# Patient Record
Sex: FEMALE | Race: OTHER | NOT HISPANIC OR LATINO | URBAN - METROPOLITAN AREA
[De-identification: names, ages, dates, MRNs, and addresses within clinical notes are randomized per-mention and may not be internally consistent; named-entity substitution may affect disease eponyms.]

---

## 2020-09-04 ENCOUNTER — EMERGENCY (EMERGENCY)
Facility: HOSPITAL | Age: 35
LOS: 0 days | Discharge: HOME | End: 2020-09-04
Attending: EMERGENCY MEDICINE | Admitting: EMERGENCY MEDICINE
Payer: COMMERCIAL

## 2020-09-04 VITALS
SYSTOLIC BLOOD PRESSURE: 133 MMHG | DIASTOLIC BLOOD PRESSURE: 77 MMHG | TEMPERATURE: 99 F | OXYGEN SATURATION: 97 % | HEART RATE: 109 BPM | RESPIRATION RATE: 20 BRPM

## 2020-09-04 VITALS — HEART RATE: 96 BPM

## 2020-09-04 DIAGNOSIS — R51 HEADACHE: ICD-10-CM

## 2020-09-04 DIAGNOSIS — R11.0 NAUSEA: ICD-10-CM
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
## 2020-09-04 DIAGNOSIS — Z88.8 ALLERGY STATUS TO OTHER DRUGS, MEDICAMENTS AND BIOLOGICAL SUBSTANCES: ICD-10-CM

## 2020-09-04 DIAGNOSIS — J45.909 UNSPECIFIED ASTHMA, UNCOMPLICATED: ICD-10-CM

## 2020-09-04 PROCEDURE — 99284 EMERGENCY DEPT VISIT MOD MDM: CPT

## 2020-09-04 RX ORDER — IBUPROFEN 200 MG
600 TABLET ORAL ONCE
Refills: 0 | Status: COMPLETED | OUTPATIENT
Start: 2020-09-04 | End: 2020-09-04

## 2020-09-04 RX ADMIN — Medication 600 MILLIGRAM(S): at 16:33
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
## 2020-09-04 NOTE — ED ADULT NURSE NOTE - NSIMPLEMENTINTERV_GEN_ALL_ED
Implemented All Universal Safety Interventions:  [place] to call system. Call bell, personal items and telephone within reach. Instruct patient to call for assistance. Room bathroom lighting operational. Non-slip footwear when patient is off stretcher. Physically safe environment: no spills, clutter or unnecessary equipment. Stretcher in lowest position, wheels locked, appropriate side rails in place.

## 2020-09-04 NOTE — ED PROVIDER NOTE - NS ED ROS FT
Constitutional: no fever, chills, no recent weight loss, change in appetite or malaise  Eyes: no redness/discharge/pain/vision changes  ENT: no rhinorrhea/ear pain/sore throat  Cardiac: No chest pain, SOB or edema.  Respiratory: No cough or respiratory distress  GI: No vomiting, diarrhea or abdominal pain.  : No dysuria, frequency, urgency or hematuria  MS: no pain to back or extremities, no loss of ROM, no weakness  Neuro: No  weakness. No LOC.  Skin: No skin rash.  Except as documented in the HPI, all other systems are negative.

## 2020-09-04 NOTE — ED PROVIDER NOTE - PATIENT PORTAL LINK FT
You can access the FollowMyHealth Patient Portal offered by [facility] by registering at the following website: [email or web address]. By joining [facility]’s FollowMyHealth portal, you will also be able to view your health information using other applications (apps) compatible with our system.

## 2020-09-04 NOTE — ED PROVIDER NOTE - PHYSICAL EXAMINATION
Alert, NAD, WDWN, NCAT, no skull/facial tender, no step-offs, no raccoon/boyer, non-toxic appearing, PERRL, EOMI, normal pupils, no icterus, normal external ENT, pink/moist membranes, pharynx normal, no sinus/tmj/dental/temporal/mastoid tender, no septal hematoma, airway intact, normal resp effort, speaking full sentences, lungs CTA b/l, CVS1S2, RRR, no m/g/r, no JVD, 2+ pulses b/l, no edema/cords/homans, warm/well-perfused, abdomen soft, no tender/dist/guard/rebound, no CVA tender, no cspine tender/step-offs, FROM neck, supple, no meningismus, trach midline, pelvis stable, no TLS spinal tender/deform/step-offs, FROM all 4 ext, no matt tender/deform, skin warm/dry, no rash, AAOx3, CN 2-12 intact, normal motor, normal sensory, normal gait, GCS15.

## 2020-09-04 NOTE — ED PROVIDER NOTE - NSFOLLOWUPCLINICS_GEN_ALL_ED_FT
[facility] ([facility])  Rehabilitation  [facility] [address], [address] [place] [address]  Phone: [phone]  Fax:   Follow Up Time:
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
## 2020-09-04 NOTE — ED PROVIDER NOTE - NSFOLLOWUPINSTRUCTIONS_ED_ALL_ED_FT

## 2021-02-10 ENCOUNTER — TRANSCRIPTION ENCOUNTER (OUTPATIENT)
Age: 36
End: 2021-02-10

## 2021-07-27 PROBLEM — Z00.00 ENCOUNTER FOR PREVENTIVE HEALTH EXAMINATION: Status: ACTIVE | Noted: 2021-07-27

## 2021-09-28 PROBLEM — J45.909 UNSPECIFIED ASTHMA, UNCOMPLICATED: Chronic | Status: ACTIVE | Noted: 2020-09-04

## 2021-09-29 ENCOUNTER — APPOINTMENT (OUTPATIENT)
Dept: ENDOCRINOLOGY | Facility: CLINIC | Age: 36
End: 2021-09-29
Payer: MEDICAID

## 2021-09-29 VITALS
TEMPERATURE: 97.2 F | SYSTOLIC BLOOD PRESSURE: 130 MMHG | OXYGEN SATURATION: 97 % | WEIGHT: 200 LBS | DIASTOLIC BLOOD PRESSURE: 70 MMHG | HEART RATE: 108 BPM | HEIGHT: 67 IN | BODY MASS INDEX: 31.39 KG/M2

## 2021-09-29 DIAGNOSIS — Z78.9 OTHER SPECIFIED HEALTH STATUS: ICD-10-CM

## 2021-09-29 DIAGNOSIS — Z83.3 FAMILY HISTORY OF DIABETES MELLITUS: ICD-10-CM

## 2021-09-29 DIAGNOSIS — Z82.49 FAMILY HISTORY OF ISCHEMIC HEART DISEASE AND OTHER DISEASES OF THE CIRCULATORY SYSTEM: ICD-10-CM

## 2021-09-29 DIAGNOSIS — Z83.49 FAMILY HISTORY OF OTHER ENDOCRINE, NUTRITIONAL AND METABOLIC DISEASES: ICD-10-CM

## 2021-09-29 PROCEDURE — 99204 OFFICE O/P NEW MOD 45 MIN: CPT

## 2021-09-29 RX ORDER — FLUTICASONE FUROATE AND VILANTEROL TRIFENATATE 100; 25 UG/1; UG/1
100-25 POWDER RESPIRATORY (INHALATION)
Refills: 0 | Status: ACTIVE | COMMUNITY

## 2021-09-29 RX ORDER — ALBUTEROL SULFATE 90 UG/1
108 (90 BASE) INHALANT RESPIRATORY (INHALATION)
Refills: 0 | Status: ACTIVE | COMMUNITY

## 2021-09-29 RX ORDER — LEVOTHYROXINE SODIUM 0.05 MG/1
50 TABLET ORAL DAILY
Qty: 68 | Refills: 4 | Status: ACTIVE | COMMUNITY
Start: 2021-09-29 | End: 1900-01-01

## 2021-09-29 NOTE — HISTORY OF PRESENT ILLNESS
[FreeTextEntry1] : 36 year old female with known Hashimoto hypothyroidism , ? PCOS who present to establish care , patient is now 11 weeks pregnant , previous endo dr Londono. \par \par Hypothyroidism :\par - diagnosed around 6 years ago, is on Synthroid brand 50 mcg 2 tablets daily , has been on this dose for > 2 years now. prefer 2 tablets 50 mcg and not the 100 mcg dosing as she tried and did not work for her . \par - current pregnancy was natural no, problems getting pregnant, has a 4 year old daughter \par - no hyper/or hypo symptoms, no hyperemesis, feeling ok except for fatigue \par \par MNG :\par followed by Dr LONDONO and had previous FNA done that where benign \par no compressive symptoms and no fh of thyroid cancer \par report FNA done in the past and was benign \par \par PCOS : she report was told has possible PCOS, periods were irregular in the past and she was on OCP, no fertility problems \par had glucose tolerance test done now , waiting result from Ob . had previous prediabetes range and used long time ago metformin with no problems,+ facial hair and cystic acne \par \par \par \par \par \par \par \par

## 2021-09-29 NOTE — REASON FOR VISIT
[Initial Evaluation] : an initial evaluation [Hypothyroidism] : hypothyroidism [Thyroid nodule/ MNG] : thyroid nodule/ MNG [PCOS] : PCOS

## 2021-09-29 NOTE — REVIEW OF SYSTEMS
[Fatigue] : fatigue [Recent Weight Gain (___ Lbs)] : recent weight gain: [unfilled] lbs [Acanthosis] : acanthosis [Hirsutism] : hirsutism [Hair Loss] : hair loss [Recent Weight Loss (___ Lbs)] : no recent weight loss [Blurred Vision] : no blurred vision [Dysphagia] : no dysphagia [Neck Pain] : no neck pain [Dysphonia] : no dysphonia [Chest Pain] : no chest pain [Slow Heart Rate] : heart rate is not slow [Palpitations] : no palpitations [Lower Ext Edema] : no lower extremity edema [Shortness Of Breath] : no shortness of breath [Cough] : no cough [Wheezing] : no wheezing [Nausea] : no nausea [Constipation] : no constipation [Diarrhea] : no diarrhea [Headaches] : no headaches [Tremors] : no tremors [Pain/Numbness of Digits] : no pain/numbness of digits [Cold Intolerance] : no cold intolerance [Heat Intolerance] : no heat intolerance

## 2021-09-29 NOTE — DATA REVIEWED
[FreeTextEntry1] : \par 8/27/21: TSH 2.250  FT4 1.38 glucose 88 Na 133 K 4  a1c 5.8% \par \par right lower pole FNA : \par bethesda II

## 2021-09-29 NOTE — ASSESSMENT
[FreeTextEntry1] : 36 year old female with known Hashimoto hypothyroidism , ? PCOS who present to establish care , patient is now 11 weeks pregnant , previous endo dr Londono. \par \par Hypothyroidism due to Hashimoto now 11 weeks pregnant \par - on Synthroid ( brand) 50 mcg 2 tablets daily, good pill technique no biotin use \par - increase to extra tablet on Saturday and sunday \par - recheck tft's in 6 weeks \par \par \par MNG :\par followed by Dr LONDONO and had previous FNA done that where benign \par no compressive symptoms and no fh of thyroid cancer \par FNA 2019 benign \par \par \par PCOS : she report was told has possible PCOS, periods were irregular in the past and she was on OCP, no fertility problems \par had glucose tolerance test done now , waiting result from Ob . had previous prediabetes range and used long time ago metformin with no problems,+ facial hair and cystic acne \par she will update me once she have results, reviewed carb consistent diet especially during pregnancy and possible use of metformin

## 2021-09-29 NOTE — PHYSICAL EXAM
[Alert] : alert [Well Nourished] : well nourished [Healthy Appearance] : healthy appearance [No Acute Distress] : no acute distress [No Proptosis] : no proptosis [No Lid Lag] : no lid lag [No Respiratory Distress] : no respiratory distress [No Accessory Muscle Use] : no accessory muscle use [Clear to Auscultation] : lungs were clear to auscultation bilaterally [Normal S1, S2] : normal S1 and S2 [No Murmurs] : no murmurs [No Edema] : no peripheral edema [Soft] : abdomen soft [No CVA Tenderness] : no ~M costovertebral angle tenderness [No Stigmata of Cushings Syndrome] : no stigmata of Cushings Syndrome [Abdominal Striae] : abdominal striae present [Acanthosis Nigricans] : acanthosis nigricans present [Hirsutism] : hirsutism present [de-identified] : thyroid enlarged , palpable nodules, + acanthosis

## 2021-10-22 RX ORDER — LEVOTHYROXINE SODIUM 50 UG/1
50 TABLET ORAL DAILY
Qty: 68 | Refills: 4 | Status: ACTIVE | COMMUNITY
Start: 2021-09-30 | End: 1900-01-01

## 2021-11-17 LAB
T3 SERPL-MCNC: 212 NG/DL
T4 FREE SERPL-MCNC: 1.4 NG/DL
TSH SERPL-ACNC: 2.16 UIU/ML

## 2021-11-23 ENCOUNTER — APPOINTMENT (OUTPATIENT)
Dept: ENDOCRINOLOGY | Facility: CLINIC | Age: 36
End: 2021-11-23
Payer: COMMERCIAL

## 2021-11-23 VITALS
BODY MASS INDEX: 32.49 KG/M2 | DIASTOLIC BLOOD PRESSURE: 78 MMHG | TEMPERATURE: 97 F | SYSTOLIC BLOOD PRESSURE: 118 MMHG | HEART RATE: 100 BPM | OXYGEN SATURATION: 97 % | WEIGHT: 207 LBS | HEIGHT: 67 IN

## 2021-11-23 DIAGNOSIS — E03.9 ENDOCRINE, NUTRITIONAL AND METABOLIC DISEASES COMPLICATING PREGNANCY, FIRST TRIMESTER: ICD-10-CM

## 2021-11-23 DIAGNOSIS — E03.9 ENDOCRINE, NUTRITIONAL AND METABOLIC DISEASES COMPLICATING PREGNANCY, SECOND TRIMESTER: ICD-10-CM

## 2021-11-23 DIAGNOSIS — O99.282 ENDOCRINE, NUTRITIONAL AND METABOLIC DISEASES COMPLICATING PREGNANCY, SECOND TRIMESTER: ICD-10-CM

## 2021-11-23 DIAGNOSIS — O99.281 ENDOCRINE, NUTRITIONAL AND METABOLIC DISEASES COMPLICATING PREGNANCY, FIRST TRIMESTER: ICD-10-CM

## 2021-11-23 PROCEDURE — 99213 OFFICE O/P EST LOW 20 MIN: CPT

## 2021-11-23 NOTE — DATA REVIEWED
[FreeTextEntry1] : \par 8/27/21: TSH 2.250  FT4 1.38 glucose 88 Na 133 K 4  a1c 5.8% \par 11/16/21: TSH 2.16  FT4 1.4 TT3 212 \par \par right lower pole FNA : \par bethesda II

## 2021-11-23 NOTE — PHYSICAL EXAM
[Alert] : alert [Well Nourished] : well nourished [Healthy Appearance] : healthy appearance [No Acute Distress] : no acute distress [No Proptosis] : no proptosis [No Lid Lag] : no lid lag [No Respiratory Distress] : no respiratory distress [No Accessory Muscle Use] : no accessory muscle use [Clear to Auscultation] : lungs were clear to auscultation bilaterally [Normal S1, S2] : normal S1 and S2 [No Murmurs] : no murmurs [No Edema] : no peripheral edema [No CVA Tenderness] : no ~M costovertebral angle tenderness [No Stigmata of Cushings Syndrome] : no stigmata of Cushings Syndrome [Abdominal Striae] : abdominal striae present [Acanthosis Nigricans] : acanthosis nigricans present [Hirsutism] : hirsutism present [de-identified] : thyroid enlarged , palpable nodules, + acanthosis

## 2021-11-23 NOTE — ASSESSMENT
[FreeTextEntry1] : 36 year old female with known Hashimoto hypothyroidism , ? PCOS who present for follow up,  patient is now 20 weeks pregnant \par  previous endo dr Londono. \par \par Hypothyroidism due to Hashimoto now 20 weeks pregnant \par - on Synthroid ( brand) 50 mcg 2 tablets daily, with extra tablet on Saturday and sunday  good pill technique no biotin use \par - continue same \par - recheck tft's in 8 weeks \par \par \par MNG :\par followed by Dr LONDONO and had previous FNA done that where benign \par no compressive symptoms and no fh of thyroid cancer \par FNA 2019 benign \par \par \par PCOS : she report was told has possible PCOS, periods were irregular in the past and she was on OCP, no fertility problems \par had glucose tolerance test done  and 3 hour glucose test is normal as by patient  . had previous prediabetes range and used long time ago metformin with no problems,+ facial hair and cystic acne \par , reviewed carb consistent diet especially during pregnancy and possible use of metformin

## 2021-11-23 NOTE — HISTORY OF PRESENT ILLNESS
[FreeTextEntry1] : 36 year old female with known Hashimoto hypothyroidism , ? PCOS who present for follow up ,  patient is now 20 weeks pregnant , previous endo dr Londono. \par \par Hypothyroidism :\par - diagnosed around 6 years ago, is on Synthroid brand 50 mcg 2 tablets daily , has been on this dose for > 2 years now. prefer 2 tablets 50 mcg and not the 100 mcg dosing as she tried and did not work for her . \par - current pregnancy was natural no, problems getting pregnant, has a 4 year old daughter \par - no hyper/or hypo symptoms, no hyperemesis, feeling ok except for fatigue \par - 11/2021: continue to take Synthroid 50 mcg 2 tablet daily and 3 tablet over the weekend, feeling ok except for fatigue, baby is ok . \par had the 3 hour glucose tolerance test few weeks ago and was ok \par \par MNG :\par followed by Dr LONDONO and had previous FNA done that where benign \par no compressive symptoms and no fh of thyroid cancer \par report FNA done in the past and was benign \par \par PCOS : she report was told has possible PCOS, periods were irregular in the past and she was on OCP, no fertility problems \par had glucose tolerance test done now , waiting result from Ob . had previous prediabetes range and used long time ago metformin with no problems,+ facial hair and cystic acne \par \par \par \par \par \par \par \par \par

## 2021-11-23 NOTE — REASON FOR VISIT
[Follow - Up] : a follow-up visit [Hypothyroidism] : hypothyroidism [Thyroid nodule/ MNG] : thyroid nodule/ MNG [PCOS] : PCOS

## 2021-12-08 ENCOUNTER — TRANSCRIPTION ENCOUNTER (OUTPATIENT)
Age: 36
End: 2021-12-08

## 2022-02-01 LAB
T3 SERPL-MCNC: 208 NG/DL
T4 SERPL-MCNC: 13.5 UG/DL
TSH SERPL-ACNC: 2.23 UIU/ML

## 2022-02-08 ENCOUNTER — APPOINTMENT (OUTPATIENT)
Dept: ENDOCRINOLOGY | Facility: CLINIC | Age: 37
End: 2022-02-08
Payer: COMMERCIAL

## 2022-02-08 DIAGNOSIS — O99.283 ENDOCRINE, NUTRITIONAL AND METABOLIC DISEASES COMPLICATING PREGNANCY, THIRD TRIMESTER: ICD-10-CM

## 2022-02-08 DIAGNOSIS — E03.9 ENDOCRINE, NUTRITIONAL AND METABOLIC DISEASES COMPLICATING PREGNANCY, THIRD TRIMESTER: ICD-10-CM

## 2022-02-08 PROCEDURE — 99441: CPT

## 2022-02-08 NOTE — ASSESSMENT
[FreeTextEntry1] : 36 year old female with known Hashimoto hypothyroidism , ? PCOS  ,  patient is now 30 weeks pregnant , requesting phone follow up \par previous endo dr Londono. \par \par Hypothyroidism due to Hashimoto now 30 weeks pregnant \par - on Synthroid ( brand) 50 mcg 2 tablets daily, with extra tablet on Saturday and sunday  good pill technique no biotin use \par - continue same , TSH ok Tt4 and T3 mildly high \par - go back to prepregancy dose after delivery and will do labs 6-8 weeks after delivery \par \par \par MNG :\par followed by Dr LONDONO and had previous FNA done that where benign \par no compressive symptoms and no fh of thyroid cancer \par FNA 2019 benign \par \par \par PCOS : she report was told has possible PCOS, periods were irregular in the past and she was on OCP, no fertility problems \par had glucose tolerance test done  and 3 hour glucose test is normal as by patient  . had previous prediabetes range and used long time ago metformin with no problems,+ facial hair and cystic acne \par , reviewed last visit  carb consistent diet especially during pregnancy and possible use of metformin

## 2022-02-08 NOTE — REASON FOR VISIT
[Follow - Up] : a follow-up visit [Hypothyroidism] : hypothyroidism [Thyroid nodule/ MNG] : thyroid nodule/ MNG [PCOS] : PCOS [Home] : at home, [unfilled] , at the time of the visit. [Medical Office: ([facility])___] : at the medical office located in  [Verbal consent obtained from patient] : the patient, [unfilled] [FreeTextEntry4] : dl

## 2022-02-08 NOTE — DATA REVIEWED
[FreeTextEntry1] : \par 8/27/21: TSH 2.250  FT4 1.38 glucose 88 Na 133 K 4  a1c 5.8% \par 11/16/21: TSH 2.16  FT4 1.4 TT3 212 \par 2/1/22: TSH 2.23  TT4 13.5 T3 208 \par \par \par \par \par \par right lower pole FNA : \par bethesda II

## 2022-02-08 NOTE — HISTORY OF PRESENT ILLNESS
[FreeTextEntry1] : 36 year old female with known Hashimoto hypothyroidism , ? PCOS  ,  patient is now 30 weeks pregnant , requesting phone follow up \par previous endo dr Londono. \par \par Hypothyroidism :\par - diagnosed around 6 years ago, is on Synthroid brand 50 mcg 2 tablets daily , has been on this dose for > 2 years now. prefer 2 tablets 50 mcg and not the 100 mcg dosing as she tried and did not work for her . \par - current pregnancy was natural no, problems getting pregnant, has a 4 year old daughter \par - no hyper/or hypo symptoms, no hyperemesis, feeling ok except for fatigue \par - 11/2021: continue to take Synthroid 50 mcg 2 tablet daily and 3 tablet over the weekend, feeling ok except for fatigue, baby is ok . \par had the 3 hour glucose tolerance test few weeks ago and was ok \par - 2/8/21: now 30 weeks, plan to have a C section on 4/11/22 feeling good denies palpitations, baby growth is good \par remain on Synthroid 50 mcg 2 tablets daily and 3 tablets over weekend \par \par MNG :\par followed by Dr LONDONO and had previous FNA done that where benign \par no compressive symptoms and no fh of thyroid cancer \par report FNA done in the past and was benign \par \par PCOS : she report was told has possible PCOS, periods were irregular in the past and she was on OCP, no fertility problems \par had glucose tolerance test done now , waiting result from Ob . had previous prediabetes range and used long time ago metformin with no problems,+ facial hair and cystic acne \par \par \par \par \par \par \par \par \par

## 2022-02-08 NOTE — PHYSICAL EXAM
[Alert] : alert [Well Nourished] : well nourished [Healthy Appearance] : healthy appearance [No Acute Distress] : no acute distress [No Proptosis] : no proptosis [No Lid Lag] : no lid lag [No Respiratory Distress] : no respiratory distress [No Accessory Muscle Use] : no accessory muscle use [Clear to Auscultation] : lungs were clear to auscultation bilaterally [Normal S1, S2] : normal S1 and S2 [No Murmurs] : no murmurs [No Edema] : no peripheral edema [No CVA Tenderness] : no ~M costovertebral angle tenderness [No Stigmata of Cushings Syndrome] : no stigmata of Cushings Syndrome [Abdominal Striae] : abdominal striae present [Acanthosis Nigricans] : acanthosis nigricans present [Hirsutism] : hirsutism present [de-identified] : thyroid enlarged , palpable nodules, + acanthosis

## 2022-08-18 LAB
ALBUMIN SERPL ELPH-MCNC: 4.3 G/DL
ALP BLD-CCNC: 128 U/L
ALT SERPL-CCNC: 15 U/L
ANION GAP SERPL CALC-SCNC: 11 MMOL/L
AST SERPL-CCNC: 16 U/L
BASOPHILS # BLD AUTO: 0.03 K/UL
BASOPHILS NFR BLD AUTO: 0.4 %
BILIRUB SERPL-MCNC: 0.3 MG/DL
BUN SERPL-MCNC: 9 MG/DL
CALCIUM SERPL-MCNC: 9.5 MG/DL
CHLORIDE SERPL-SCNC: 102 MMOL/L
CO2 SERPL-SCNC: 22 MMOL/L
CREAT SERPL-MCNC: 0.7 MG/DL
EGFR: 115 ML/MIN/1.73M2
EOSINOPHIL # BLD AUTO: 0.39 K/UL
EOSINOPHIL NFR BLD AUTO: 5 %
GLUCOSE SERPL-MCNC: 103 MG/DL
HCT VFR BLD CALC: 38.2 %
HGB BLD-MCNC: 12.3 G/DL
IMM GRANULOCYTES NFR BLD AUTO: 0.3 %
LYMPHOCYTES # BLD AUTO: 2.4 K/UL
LYMPHOCYTES NFR BLD AUTO: 30.7 %
MAN DIFF?: NORMAL
MCHC RBC-ENTMCNC: 27.8 PG
MCHC RBC-ENTMCNC: 32.2 G/DL
MCV RBC AUTO: 86.2 FL
MONOCYTES # BLD AUTO: 0.5 K/UL
MONOCYTES NFR BLD AUTO: 6.4 %
NEUTROPHILS # BLD AUTO: 4.47 K/UL
NEUTROPHILS NFR BLD AUTO: 57.2 %
PLATELET # BLD AUTO: 367 K/UL
POTASSIUM SERPL-SCNC: 4.5 MMOL/L
PROT SERPL-MCNC: 7.3 G/DL
RBC # BLD: 4.43 M/UL
RBC # FLD: 13.9 %
SODIUM SERPL-SCNC: 135 MMOL/L
T4 FREE SERPL-MCNC: 1.3 NG/DL
TSH SERPL-ACNC: 2.42 UIU/ML
WBC # FLD AUTO: 7.81 K/UL

## 2022-08-22 ENCOUNTER — APPOINTMENT (OUTPATIENT)
Dept: ENDOCRINOLOGY | Facility: CLINIC | Age: 37
End: 2022-08-22

## 2022-08-22 VITALS
BODY MASS INDEX: 31.39 KG/M2 | HEART RATE: 80 BPM | SYSTOLIC BLOOD PRESSURE: 118 MMHG | TEMPERATURE: 97.2 F | WEIGHT: 200 LBS | DIASTOLIC BLOOD PRESSURE: 72 MMHG | HEIGHT: 67 IN | OXYGEN SATURATION: 98 %

## 2022-08-22 DIAGNOSIS — E04.2 NONTOXIC MULTINODULAR GOITER: ICD-10-CM

## 2022-08-22 DIAGNOSIS — E06.3 OTHER SPECIFIED HYPOTHYROIDISM: ICD-10-CM

## 2022-08-22 DIAGNOSIS — E28.2 POLYCYSTIC OVARIAN SYNDROME: ICD-10-CM

## 2022-08-22 DIAGNOSIS — E03.8 OTHER SPECIFIED HYPOTHYROIDISM: ICD-10-CM

## 2022-08-22 PROCEDURE — 99214 OFFICE O/P EST MOD 30 MIN: CPT

## 2022-08-22 RX ORDER — METFORMIN HYDROCHLORIDE 500 MG/1
500 TABLET, COATED ORAL
Qty: 180 | Refills: 1 | Status: ACTIVE | COMMUNITY
Start: 2022-08-22 | End: 1900-01-01

## 2022-08-22 NOTE — DATA REVIEWED
[FreeTextEntry1] : \par 8/27/21: TSH 2.250  FT4 1.38 glucose 88 Na 133 K 4  a1c 5.8% \par 11/16/21: TSH 2.16  FT4 1.4 TT3 212 \par 2/1/22: TSH 2.23  TT4 13.5 T3 208 \par 8/2022: glucose 103 crea 0.7  ALk phos 128 GFr 115 TSh 2.42  Ft4 1.3  \par \par \par \par \par \par right lower pole FNA : \par bethesda II

## 2022-08-22 NOTE — HISTORY OF PRESENT ILLNESS
[FreeTextEntry1] : 37 year old female with known Hashimoto hypothyroidism , ? PCOS  ,  patient is now 5 months post partum \par previous endo dr Londono. \par \par Hypothyroidism :\par - diagnosed around 6 years ago, is on Synthroid brand 50 mcg 2 tablets daily , has been on this dose for > 2 years now. prefer 2 tablets 50 mcg and not the 100 mcg dosing as she tried and did not work for her . \par - current pregnancy was natural no, problems getting pregnant, has a 4 year old daughter \par - no hyper/or hypo symptoms, no hyperemesis, feeling ok except for fatigue \par - 11/2021: continue to take Synthroid 50 mcg 2 tablet daily and 3 tablet over the weekend, feeling ok except for fatigue, baby is ok . \par had the 3 hour glucose tolerance test few weeks ago and was ok \par - 2/8/21: 30 weeks, plan to have a C section on 4/11/22 feeling good denies palpitations, baby growth is good \par remain on Synthroid 50 mcg 2 tablets daily and 3 tablets over weekend \par - 8/2022: patient now post partum , delivered in 4/2022, BP was high baby was delivered one week earlier, not breastfeeding and periods are regular , unable to loose the weight , she decrease Lt4 to 50 mcg 2 tablets daily post op \par \par MNG :\par followed by Dr LONDONO and had previous FNA done that where benign \par no compressive symptoms and no fh of thyroid cancer \par report FNA done in the past and was benign \par \par PCOS : she report was told has possible PCOS, periods were irregular in the past and she was on OCP, no fertility problems \par had glucose tolerance test done now , waiting result from Ob . had previous prediabetes range and used long time ago metformin with no problems,+ facial hair and cystic acne \par \par \par \par \par \par \par \par \par \par

## 2022-08-22 NOTE — PHYSICAL EXAM
[Alert] : alert [Well Nourished] : well nourished [No Acute Distress] : no acute distress [No Proptosis] : no proptosis [No Lid Lag] : no lid lag [No Respiratory Distress] : no respiratory distress [No Accessory Muscle Use] : no accessory muscle use [Clear to Auscultation] : lungs were clear to auscultation bilaterally [No Murmurs] : no murmurs [Regular Rhythm] : with a regular rhythm [No Edema] : no peripheral edema [Not Tender] : non-tender [Soft] : abdomen soft [No Stigmata of Cushings Syndrome] : no stigmata of Cushings Syndrome [Acanthosis Nigricans] : acanthosis nigricans present [Hirsutism] : hirsutism present [No Tremors] : no tremors [Oriented x3] : oriented to person, place, and time [de-identified] : thyroid enlarged bilaterally and right > left

## 2022-08-22 NOTE — ASSESSMENT
[FreeTextEntry1] : 37 year old female with known Hashimoto hypothyroidism , ? PCOS  , who present for follow up now post partum \par previous endo dr Londono. \par \par Hypothyroidism due to Hashimoto now 30 weeks pregnant \par - on Synthroid ( brand) 50 mcg 2 tablets daily, since delivery in 4/2022 \par - continue same , TSH ok Tt4 \par \par \par \par MNG :\par followed by Dr LONDONO and had previous FNA done that where benign \par no compressive symptoms and no fh of thyroid cancer \par FNA 2019 benign \par - on exam thyroid enlarged , will redo US \par \par \par PCOS : she report was told has possible PCOS, periods were irregular in the past and she was on OCP, no fertility problems \par had glucose tolerance test done  and 3 hour glucose test is normal as by patient  . had previous prediabetes range and used long time ago metformin with no problems,+ facial hair and cystic acne \par - will resume metformin 500 mg BID \par  [Carbohydrate Consistent Diet] : carbohydrate consistent diet [Importance of Diet and Exercise] : importance of diet and exercise to improve glycemic control, achieve weight loss and improve cardiovascular health

## 2023-01-10 ENCOUNTER — APPOINTMENT (OUTPATIENT)
Dept: ENDOCRINOLOGY | Facility: CLINIC | Age: 38
End: 2023-01-10

## 2023-04-04 ENCOUNTER — RX RENEWAL (OUTPATIENT)
Age: 38
End: 2023-04-04

## 2023-04-04 RX ORDER — LEVOTHYROXINE SODIUM 50 UG/1
50 TABLET ORAL DAILY
Qty: 60 | Refills: 0 | Status: ACTIVE | COMMUNITY
Start: 2021-10-26 | End: 1900-01-01

## 2024-05-25 ENCOUNTER — NON-APPOINTMENT (OUTPATIENT)
Age: 39
End: 2024-05-25